# Patient Record
Sex: MALE | Race: OTHER | HISPANIC OR LATINO
[De-identification: names, ages, dates, MRNs, and addresses within clinical notes are randomized per-mention and may not be internally consistent; named-entity substitution may affect disease eponyms.]

---

## 2021-08-26 ENCOUNTER — RESULT REVIEW (OUTPATIENT)
Age: 81
End: 2021-08-26

## 2021-08-26 ENCOUNTER — APPOINTMENT (OUTPATIENT)
Dept: SURGERY | Facility: CLINIC | Age: 81
End: 2021-08-26
Payer: MEDICARE

## 2021-08-26 VITALS
OXYGEN SATURATION: 95 % | HEART RATE: 79 BPM | BODY MASS INDEX: 25.16 KG/M2 | HEIGHT: 63 IN | DIASTOLIC BLOOD PRESSURE: 80 MMHG | WEIGHT: 142 LBS | SYSTOLIC BLOOD PRESSURE: 110 MMHG | TEMPERATURE: 97.2 F

## 2021-08-26 DIAGNOSIS — R91.1 SOLITARY PULMONARY NODULE: ICD-10-CM

## 2021-08-26 DIAGNOSIS — K21.9 GASTRO-ESOPHAGEAL REFLUX DISEASE W/OUT ESOPHAGITIS: ICD-10-CM

## 2021-08-26 DIAGNOSIS — E11.9 TYPE 2 DIABETES MELLITUS W/OUT COMPLICATIONS: ICD-10-CM

## 2021-08-26 DIAGNOSIS — Z87.19 PERSONAL HISTORY OF OTHER DISEASES OF THE DIGESTIVE SYSTEM: ICD-10-CM

## 2021-08-26 DIAGNOSIS — E78.5 HYPERLIPIDEMIA, UNSPECIFIED: ICD-10-CM

## 2021-08-26 DIAGNOSIS — Z78.9 OTHER SPECIFIED HEALTH STATUS: ICD-10-CM

## 2021-08-26 DIAGNOSIS — R19.4 CHANGE IN BOWEL HABIT: ICD-10-CM

## 2021-08-26 DIAGNOSIS — R63.0 ANOREXIA: ICD-10-CM

## 2021-08-26 DIAGNOSIS — K57.30 DIVERTICULOSIS OF LARGE INTESTINE W/OUT PERFORATION OR ABSCESS W/OUT BLEEDING: ICD-10-CM

## 2021-08-26 DIAGNOSIS — Z87.09 PERSONAL HISTORY OF OTHER DISEASES OF THE RESPIRATORY SYSTEM: ICD-10-CM

## 2021-08-26 DIAGNOSIS — M51.36 OTHER INTERVERTEBRAL DISC DEGENERATION, LUMBAR REGION: ICD-10-CM

## 2021-08-26 PROCEDURE — 99205 OFFICE O/P NEW HI 60 MIN: CPT

## 2021-08-26 RX ORDER — AMLODIPINE BESYLATE 5 MG/1
TABLET ORAL
Refills: 0 | Status: ACTIVE | COMMUNITY

## 2021-08-26 RX ORDER — GLIPIZIDE 2.5 MG/1
TABLET ORAL
Refills: 0 | Status: ACTIVE | COMMUNITY

## 2021-08-26 RX ORDER — TICAGRELOR 60 MG/1
TABLET ORAL
Refills: 0 | Status: ACTIVE | COMMUNITY

## 2021-08-26 RX ORDER — FAMOTIDINE 20 MG/1
20 TABLET, FILM COATED ORAL
Refills: 0 | Status: ACTIVE | COMMUNITY

## 2021-08-26 RX ORDER — FLUTICASONE PROPIONATE 50 MCG
SPRAY, SUSPENSION NASAL
Refills: 0 | Status: ACTIVE | COMMUNITY

## 2021-08-31 ENCOUNTER — OUTPATIENT (OUTPATIENT)
Dept: OUTPATIENT SERVICES | Facility: HOSPITAL | Age: 81
LOS: 1 days | End: 2021-08-31
Payer: MEDICARE

## 2021-08-31 ENCOUNTER — APPOINTMENT (OUTPATIENT)
Dept: CT IMAGING | Facility: CLINIC | Age: 81
End: 2021-08-31
Payer: MEDICARE

## 2021-08-31 ENCOUNTER — TRANSCRIPTION ENCOUNTER (OUTPATIENT)
Age: 81
End: 2021-08-31

## 2021-08-31 DIAGNOSIS — R10.2 PELVIC AND PERINEAL PAIN: ICD-10-CM

## 2021-08-31 PROCEDURE — 74176 CT ABD & PELVIS W/O CONTRAST: CPT | Mod: 26,MH

## 2021-08-31 PROCEDURE — 74176 CT ABD & PELVIS W/O CONTRAST: CPT

## 2021-09-03 PROBLEM — R19.4 FREQUENT BOWEL MOVEMENTS: Status: ACTIVE | Noted: 2021-08-26

## 2021-09-03 PROBLEM — K57.30 DIVERTICULOSIS OF COLON: Status: ACTIVE | Noted: 2021-08-26

## 2021-09-03 NOTE — REASON FOR VISIT
[Initial Evaluation] : an initial evaluation [FreeTextEntry1] : regarding "lower abdominal pain... my hernia?"

## 2021-09-03 NOTE — PLAN
[FreeTextEntry1] : Symptomatic left inguinal hernia.  \par \par Given the associated discomfort, any GI complaint, persistence of symptoms as well as the patient's understandable anxiety, at least consideration of operative intervention does seem indicated and appropriate.\par \par The risks, benefits and nature of the operative intervention have been reviewed at length with Mr. GARRISON , including but not limited to the approximate size and location of the estimated or typical operative incision, the planned utilization of a permanent mesh, the possibility of urinary retention during the perioperative period, the possibility of infection, the possibility of chronic groin pain or perineural scarring or paresthesias or loss of sensation, the possibility of future recurrence and the likelihood of a visually noticeable “scar”/ incision with residual abdominal wall asymmetry.  The use of the On-Q Pain Pump has been discussed.\par \par Mr. GARRISON  understands that a period of recovery and cessation of vigorous activity will be required.  Mr. GARRISON demonstrates overall good insight and remains eager to proceed.  While there are no further specific concerns presently, I have welcomed follow-up with me and my office at any time in the interim to discuss any future questions.  Operative intervention can follow any standard pre-surgical testing or medical clearance.\par \par The hernia is reducible today, but with effort.  Given the intimacy of the colon, I do think that he is at risk for a more complex course should there be an emergency.  The right groin is clinically intact on my dedicated exam.  Given the time lapse since the initial study and the concern for the contralateral side (he has also had a colonoscopy following the CT by his own history today), I do think that interval imaging would help further delineate the anatomy and assist in operative planning.  He is pleased and agrees. \par \par In the interim, I remain available.\par \par Please note that more than 50% of face to face time was spent in counseling and coordination of care.

## 2021-09-03 NOTE — DATA REVIEWED
[FreeTextEntry1] : Outside CT abdomen and pelvis\par \par 6/23/2021\par \par "large non obstructing fat containing left inguinal hernia contains a portion of the distal descending/ proximal sigmoid colon.  Wall thickening versus under-distension of the colon at the site of the hernia..."\par \par "fat-containing right groin hernia... likely a femoral hernia."

## 2021-09-03 NOTE — REVIEW OF SYSTEMS
[Feeling Poorly] : feeling poorly [Feeling Tired] : feeling tired [Recent Weight Loss (___ Lbs)] : recent [unfilled] ~Ulb weight loss [As Noted in HPI] : as noted in HPI [Abdominal Pain] : abdominal pain [Joint Swelling] : joint swelling [Joint Stiffness] : joint stiffness [Anxiety] : anxiety [Depression] : no depression [Negative] : Heme/Lymph [FreeTextEntry7] : Lower abdominal "pressure... pain... bloating..." [FreeTextEntry9] : "arthritis..." [de-identified] : regarding this issue and visit...

## 2021-09-03 NOTE — HISTORY OF PRESENT ILLNESS
[de-identified] : Very pleasant though admittedly anxious patient presenting with complaint of lower abdominal discomfort for "some time."\par He also complains of general fatigue, malaise, loss of appetite with weight loss and yet 3 to 4 BMs daily/\par He has had an extensive work up to date in regards to these complaints.\par He was urgently directed to office today due to recent CT scan concerning of incarceration of left inguinal hernia.\par Fortunately, he denies pain at present...

## 2021-09-03 NOTE — PHYSICAL EXAM
[Respiratory Effort] : normal respiratory effort [Normal Rate and Rhythm] : normal rate and rhythm [No HSM] : no hepatosplenomegaly [Tender] : was nontender [Enlarged] : not enlarged [No Rash or Lesion] : No rash or lesion [Alert] : alert [Oriented to Person] : oriented to person [Oriented to Place] : oriented to place [Oriented to Time] : oriented to time [Anxious] : anxious [de-identified] : Appears mildly fatigued/ anxious, but no acute distress, ambulates easily into office and assumes examination table without need of assistance.  [de-identified] : Normocephalic and atraumatic.  [de-identified] : Supple with full range of motion.  [FreeTextEntry1] : No cervical, supraclavicular, axillary or inguinal adenopathy.  [de-identified] : No visible lesions or palpable masses [de-identified] : Soft non tense and non tender.\par Epigastrium WNL.\par Periumbilical fascia intact.\par Right groin with faint/ well healed inguinal hernia.\par No visible/ palpable inguinal, femoral or Spigelian hernia at this time.\par Left groin with moderately sized and soft inguinal hernia.\par Fully reducible, though more extended maneuvers required.\par Adjacent femoral and Spigelian spaces intact. [de-identified] : Penis free of lesions.  Cords free of cysts.  Left testicle slightly smaller and more high riding than right- but both free of mass. [de-identified] : Deferred.  [de-identified] : Grossly symmetric and within normal limits without any obvious motor or sensory deficits.  [de-identified] : though appropriately so...

## 2021-09-22 ENCOUNTER — APPOINTMENT (OUTPATIENT)
Dept: SURGERY | Facility: CLINIC | Age: 81
End: 2021-09-22
Payer: MEDICARE

## 2021-09-22 VITALS
HEIGHT: 63 IN | HEART RATE: 55 BPM | TEMPERATURE: 97.9 F | DIASTOLIC BLOOD PRESSURE: 68 MMHG | WEIGHT: 142 LBS | SYSTOLIC BLOOD PRESSURE: 100 MMHG | BODY MASS INDEX: 25.16 KG/M2 | OXYGEN SATURATION: 98 %

## 2021-09-22 PROCEDURE — 99215 OFFICE O/P EST HI 40 MIN: CPT

## 2021-09-22 RX ORDER — CETIRIZINE HYDROCHLORIDE 10 MG/1
10 TABLET, COATED ORAL
Refills: 0 | Status: ACTIVE | COMMUNITY

## 2021-09-22 RX ORDER — ESOMEPRAZOLE MAGNESIUM 5 MG/1
GRANULE, DELAYED RELEASE ORAL
Refills: 0 | Status: COMPLETED | COMMUNITY
End: 2021-09-22

## 2021-09-22 RX ORDER — IPRATROPIUM BROMIDE 21 UG/1
0.03 SPRAY NASAL
Refills: 0 | Status: ACTIVE | COMMUNITY

## 2021-09-22 RX ORDER — PANTOPRAZOLE SODIUM 40 MG/1
GRANULE, DELAYED RELEASE ORAL
Refills: 0 | Status: COMPLETED | COMMUNITY
End: 2021-09-22

## 2021-09-26 NOTE — HISTORY OF PRESENT ILLNESS
[de-identified] : Very pleasant though admittedly anxious patient presenting with complaint of lower abdominal discomfort for "some time."\par He also complains of general fatigue, malaise, loss of appetite with weight loss and yet 3 to 4 BMs daily/\par He has had an extensive work up to date in regards to these complaints.\par He was urgently directed to office today due to recent CT scan concerning of incarceration of left inguinal hernia.\par Fortunately, he denies pain at present... [de-identified] : Very pleasant though admittedly anxious gentleman returning for ongoing General Surgery follow-up.\par He reports that his lower abdominal discomfort has improved, but not resolved.\par He continues to complain of mild general fatigue +/-  malaise, some loss of appetite and 3 to 4 BMs small BM's daily.\par He has had an extensive work up to date in regards to these complaints including a follow-up CT under my direction..\par He is aware of a visible and palpable fullness in the left groin, but denies any appreciable change in the right groin. \par Fortunately, he denies acute pain once again today...

## 2021-09-26 NOTE — PHYSICAL EXAM
[Respiratory Effort] : normal respiratory effort [Normal Rate and Rhythm] : normal rate and rhythm [No HSM] : no hepatosplenomegaly [Tender] : was nontender [Enlarged] : not enlarged [No Rash or Lesion] : No rash or lesion [Alert] : alert [Oriented to Person] : oriented to person [Oriented to Place] : oriented to place [Oriented to Time] : oriented to time [Anxious] : anxious [de-identified] : Appears minimally fatigued/ anxious, but no acute distress. [de-identified] : Remains normocephalic and atraumatic.  [de-identified] : Still supple with full range of motion.  [FreeTextEntry1] : No cervical, supraclavicular, axillary or inguinal adenopathy on my exam.  [de-identified] : No visible lesion or palpable mass. [de-identified] : Soft, non tense and non tender- all stable.\par Epigastrium WNL- all stable.\par Periumbilical fascia intact- all stable.\par Right groin with faint/ well healed inguinal hernia repair incision- all stable.\par No visible/ palpable recurrent right inguinal, femoral or Spigelian hernia.\par Left groin with moderately sized and soft inguinal hernia.\par Fully reducible, though more extended maneuvers once again required.\par Adjacent femoral and Spigelian spaces intact- all stable. [de-identified] : Penis free of lesion.  Cords free of cyst.  Left testicle slightly smaller and more high riding than right- but both free of masses. [de-identified] : Deferred.  [de-identified] : Grossly symmetric and within normal limits without motor or sensory deficit.  [de-identified] : albeit less so...

## 2021-09-26 NOTE — REVIEW OF SYSTEMS
[Feeling Poorly] : not feeling poorly [Feeling Tired] : feeling tired [Recent Weight Loss (___ Lbs)] : recent [unfilled] ~Ulb weight loss [As Noted in HPI] : as noted in HPI [Abdominal Pain] : abdominal pain [Joint Swelling] : joint swelling [Joint Stiffness] : joint stiffness [Anxiety] : anxiety [Depression] : no depression [Negative] : Heme/Lymph [FreeTextEntry7] : Lower abdominal "pressure... pain... bloating..." [FreeTextEntry9] : "arthritis..." [de-identified] : regarding this issue and visit, though less so...

## 2021-09-26 NOTE — DATA REVIEWED
[FreeTextEntry1] : EXAM:  CT ABDOMEN AND PELVIS OC  \par PROCEDURE DATE:  08/31/2021  \par INTERPRETATION:  CLINICAL INFORMATION: Inguinal hernia, abdominal pain, history of blue spell limits.\par COMPARISON: CT 6/27/2013\par CONTRAST/COMPLICATIONS:\par IV Contrast: NONE\par Oral Contrast: Smoothie Readi-Cat 2\par Complications: None reported at time of study completion\par PROCEDURE:\par CT of the Abdomen and Pelvis was performed.\par Sagittal and coronal reformats were performed.\par FINDINGS: Evaluation of the solid organs and vasculature is limited in the absence of intravenous contrast.\par LOWER CHEST: Aortic and coronary artery calcifications.\par LIVER: Within normal limits.\par BILE DUCTS: Normal caliber.\par GALLBLADDER: Within normal limits.\par SPLEEN: Within normal limits.\par PANCREAS: Within normal limits.\par ADRENALS: Within normal limits.\par KIDNEYS/URETERS: Small left renal cyst. No renal calculi or hydronephrosis.\par BLADDER: Underdistended.\par REPRODUCTIVE ORGANS: Enlarged prostate.\par BOWEL: Diffuse colonic diverticulosis without evidence of diverticulitis. No bowel obstruction. Appendix is normal.\par PERITONEUM: No ascites.\par VESSELS: Atherosclerotic changes.\par RETROPERITONEUM/LYMPH NODES: No lymphadenopathy.\par ABDOMINAL WALL: Left inguinal hernia contains non obstructed sigmoid colon.\par BONES: Degenerative changes.\par IMPRESSION:\par Left inguinal hernia containing non obstructed sigmoid colon.\par --- End of Report ---\par KIAN BUTLER MD; Attending Radiologist \par This document has been electronically signed. Sep  5 2021  8:04AM

## 2021-09-26 NOTE — REASON FOR VISIT
[Follow-Up: _____] : a [unfilled] follow-up visit [FreeTextEntry1] : regarding "lower abdominal pain... my hernia?"

## 2021-09-26 NOTE — PLAN
[FreeTextEntry1] : Symptomatic left inguinal hernia containing non obstructing colon.  \par \par Given the seemingly associated discomfort, ongoing GI complaints, persistence of symptoms over time as well as the patient's understandable anxiety, operative intervention indicated and appropriate.\par \par The risks, benefits and nature of the operative intervention have been reviewed again at length with Mr. AGRRISON , including but not limited to the approximate size and location of the estimated or typical operative incision, the planned utilization of a permanent mesh, the possibility of urinary retention during the perioperative period, the possibility of infection, the possibility of chronic groin pain or perineural scarring or paresthesias or loss of sensation, the possibility of future recurrence and the likelihood of a visually noticeable “scar”/ incision with residual abdominal wall asymmetry.  The use of the On-Q Pain Pump has been discussed.\par \par Mr. GARRISON  understands that a period of recovery and cessation of vigorous activity will be required.  Mr. GARRISON demonstrates overall good insight and remains eager to proceed.  While there are no further specific concerns presently, I have welcomed follow-up with me and my office at any time in the interim to discuss any future questions.  Operative intervention can follow any standard pre-surgical testing or medical clearance.\par \par The right groin is free of complaint per patient, once again clinically intact on my repeat exam and now non-suggestive on interval CT exam.\par \par Given the involvement of the colon within the hernia, a modified bowel preparation will be completed prior to the OR.  He has been given those instructions today.\par \par Please note that more than 50% of face to face time was spent in counseling and coordination of care.

## 2021-11-08 ENCOUNTER — OUTPATIENT (OUTPATIENT)
Dept: OUTPATIENT SERVICES | Facility: HOSPITAL | Age: 81
LOS: 1 days | End: 2021-11-08
Payer: MEDICARE

## 2021-11-08 VITALS
DIASTOLIC BLOOD PRESSURE: 70 MMHG | HEIGHT: 61 IN | OXYGEN SATURATION: 96 % | HEART RATE: 71 BPM | WEIGHT: 134.04 LBS | SYSTOLIC BLOOD PRESSURE: 180 MMHG | RESPIRATION RATE: 16 BRPM | TEMPERATURE: 98 F

## 2021-11-08 DIAGNOSIS — K40.90 UNILATERAL INGUINAL HERNIA, WITHOUT OBSTRUCTION OR GANGRENE, NOT SPECIFIED AS RECURRENT: ICD-10-CM

## 2021-11-08 DIAGNOSIS — Z98.49 CATARACT EXTRACTION STATUS, UNSPECIFIED EYE: Chronic | ICD-10-CM

## 2021-11-08 DIAGNOSIS — Z01.818 ENCOUNTER FOR OTHER PREPROCEDURAL EXAMINATION: ICD-10-CM

## 2021-11-08 LAB
ALBUMIN SERPL ELPH-MCNC: 3.9 G/DL — SIGNIFICANT CHANGE UP (ref 3.3–5)
ALP SERPL-CCNC: 62 U/L — SIGNIFICANT CHANGE UP (ref 30–120)
ALT FLD-CCNC: 19 U/L DA — SIGNIFICANT CHANGE UP (ref 10–60)
ANION GAP SERPL CALC-SCNC: 7 MMOL/L — SIGNIFICANT CHANGE UP (ref 5–17)
AST SERPL-CCNC: 23 U/L — SIGNIFICANT CHANGE UP (ref 10–40)
BILIRUB SERPL-MCNC: 0.4 MG/DL — SIGNIFICANT CHANGE UP (ref 0.2–1.2)
BUN SERPL-MCNC: 19 MG/DL — SIGNIFICANT CHANGE UP (ref 7–23)
CALCIUM SERPL-MCNC: 9.2 MG/DL — SIGNIFICANT CHANGE UP (ref 8.4–10.5)
CHLORIDE SERPL-SCNC: 102 MMOL/L — SIGNIFICANT CHANGE UP (ref 96–108)
CO2 SERPL-SCNC: 28 MMOL/L — SIGNIFICANT CHANGE UP (ref 22–31)
CREAT SERPL-MCNC: 1.47 MG/DL — HIGH (ref 0.5–1.3)
GLUCOSE SERPL-MCNC: 85 MG/DL — SIGNIFICANT CHANGE UP (ref 70–99)
HCT VFR BLD CALC: 40.4 % — SIGNIFICANT CHANGE UP (ref 39–50)
HGB BLD-MCNC: 13 G/DL — SIGNIFICANT CHANGE UP (ref 13–17)
MCHC RBC-ENTMCNC: 28 PG — SIGNIFICANT CHANGE UP (ref 27–34)
MCHC RBC-ENTMCNC: 32.2 GM/DL — SIGNIFICANT CHANGE UP (ref 32–36)
MCV RBC AUTO: 87.1 FL — SIGNIFICANT CHANGE UP (ref 80–100)
NRBC # BLD: 0 /100 WBCS — SIGNIFICANT CHANGE UP (ref 0–0)
PLATELET # BLD AUTO: 271 K/UL — SIGNIFICANT CHANGE UP (ref 150–400)
POTASSIUM SERPL-MCNC: 4.3 MMOL/L — SIGNIFICANT CHANGE UP (ref 3.5–5.3)
POTASSIUM SERPL-SCNC: 4.3 MMOL/L — SIGNIFICANT CHANGE UP (ref 3.5–5.3)
PROT SERPL-MCNC: 7.6 G/DL — SIGNIFICANT CHANGE UP (ref 6–8.3)
RBC # BLD: 4.64 M/UL — SIGNIFICANT CHANGE UP (ref 4.2–5.8)
RBC # FLD: 14.3 % — SIGNIFICANT CHANGE UP (ref 10.3–14.5)
SODIUM SERPL-SCNC: 137 MMOL/L — SIGNIFICANT CHANGE UP (ref 135–145)
WBC # BLD: 8.36 K/UL — SIGNIFICANT CHANGE UP (ref 3.8–10.5)
WBC # FLD AUTO: 8.36 K/UL — SIGNIFICANT CHANGE UP (ref 3.8–10.5)

## 2021-11-08 PROCEDURE — G0463: CPT

## 2021-11-08 PROCEDURE — 36415 COLL VENOUS BLD VENIPUNCTURE: CPT

## 2021-11-08 PROCEDURE — 93010 ELECTROCARDIOGRAM REPORT: CPT

## 2021-11-08 PROCEDURE — 83036 HEMOGLOBIN GLYCOSYLATED A1C: CPT

## 2021-11-08 PROCEDURE — 93005 ELECTROCARDIOGRAM TRACING: CPT

## 2021-11-08 PROCEDURE — 85027 COMPLETE CBC AUTOMATED: CPT

## 2021-11-08 PROCEDURE — 80053 COMPREHEN METABOLIC PANEL: CPT

## 2021-11-08 NOTE — H&P PST ADULT - HISTORY OF PRESENT ILLNESS
82 yo male is scheduled for open repair of left inguinal hernia on 11/16/2021 at [facility].  He reports left groin pressure and increased bowel movements for last 3 months.  he was diagnosed with left inguinal hernia by MRI and sonogram.

## 2021-11-08 NOTE — ASU DISCHARGE PLAN (ADULT/PEDIATRIC) - BATHING
May shower after 48 hours/Shower only/Do not submerge in water after 48 hours/Shower only/Do not submerge in water

## 2021-11-08 NOTE — H&P PST ADULT - NSICDXPASTMEDICALHX_GEN_ALL_CORE_FT
PAST MEDICAL HISTORY:  Autonomic peripheral neuropathy     CAD (coronary artery disease)     COVID-19 vaccine series completed     Diverticulitis     Dyslipidemia     Gastritis, erosive     Hearing loss     Hepatitis B, chronic     Hernia, inguinal, left     Hypertension     MI (myocardial infarction) 1989    Post traumatic stress disorder (PTSD)     Postnasal drip     Renal cyst     Retroperitoneal hernia     S/P primary angioplasty with coronary stent     Sleep apnea     Stented coronary artery X9    Type 2 diabetes mellitus

## 2021-11-08 NOTE — H&P PST ADULT - NSICDXPASTSURGICALHX_GEN_ALL_CORE_FT
PAST SURGICAL HISTORY:  Dental disorder     H/O right inguinal hernia repair age 9    H/O vasectomy     H/O ventral hernia repair with hiatal hernia repair, 2011    History of cataract surgery bilateral, 2016    S/P CABG x 3 2000

## 2021-11-08 NOTE — ASU DISCHARGE PLAN (ADULT/PEDIATRIC) - CARE PROVIDER_API CALL
Kevan Bang)  Surgery  [address], [place] [id number]  Phone: [phone]  Fax: [phone]  Follow Up Time:    Kevan Bang)  Surgery  [address], [place] [id number]  Phone: [phone]  Fax: [phone]  Follow Up Time: 2 weeks

## 2021-11-08 NOTE — H&P PST ADULT - NSICDXFAMILYHX_GEN_ALL_CORE_FT
FAMILY HISTORY:  Father  Still living? No  Family history of heart disease, Age at diagnosis: Age Unknown    Sibling  Still living? No  Family history of heart attack, Age at diagnosis: Age Unknown

## 2021-11-08 NOTE — H&P PST ADULT - ASSESSMENT
80 yo male is scheduled for open repair of left inguinal hernia on 11/16/2021 @ [facility]

## 2021-11-08 NOTE — H&P PST ADULT - PROBLEM SELECTOR PLAN 1
Open repair of left inguinal hernia is planned for 11/16/2021   Covid PCR testing is scheduled for 11/12/2021 @ [facility]  Patient stopped taking Billinta as per Dr Bang instructions on 11/6/2021; I called Dr Bang office to verify.  I called Dr Cisneros to inform him patient stopped Brillinta and aspirin; left a message with nurse Orantes.    patient instructed to follow up with Dr cisneros  Cardiac and medical clearance appointment scheduled for 11/11/21  Pre op instructions reviewed; contact info given; best wishes offered.

## 2021-11-08 NOTE — ASU DISCHARGE PLAN (ADULT/PEDIATRIC) - ASU DC SPECIAL INSTRUCTIONSFT
Follow all verbal and written instructions. Take medications as prescribed. DO NOT drive, operate machinery, and/or make important decisions while on prescription pain medication. DO NOT hesitate to call Doctor's office with questions or concerns. Certain prescription pain medication can cause constipation; take a stool softener such as Colace 100mg 3 x a day, to avoid the constipating effects of prescription pain medication.    Make sure white clip is NOT crimping the tubing of your On Q pump, so as to allow flow of medication. After 48 hours - remove dressing, pull out tubing, and put bandaids over the site.    For the next 24-48 hours while awake, alternate ice pack 15 minutes on & 15 minutes off    Call Dr. Smith's office at [phone] to make an appointment to be seen within 7- 10 days REST and relax!    Apply ice packs for 30 minutes on/ 30 minutes off every hour while awake for next 48 hours.  May take Tylenol for minor pain.  Please minimize any Aspirin, Advil or Motrin for the next 48 hours.  A script for pain medication has been forwarded to your pharmacy.  Take an over the counter stool softener like COLACE twice daily to prevent constipation.  You may remove outer dressings with On-Q Pain Pump after 48 hours.   Leave Steri Strips in place.  Once the Pump is removed, you may then restart your Brilinta.  Call for any questions or concerns!

## 2021-11-09 LAB
A1C WITH ESTIMATED AVERAGE GLUCOSE RESULT: 5.8 % — HIGH (ref 4–5.6)
ESTIMATED AVERAGE GLUCOSE: 120 MG/DL — HIGH (ref 68–114)

## 2021-11-12 ENCOUNTER — OUTPATIENT (OUTPATIENT)
Dept: OUTPATIENT SERVICES | Facility: HOSPITAL | Age: 81
LOS: 1 days | End: 2021-11-12
Payer: MEDICARE

## 2021-11-12 DIAGNOSIS — K40.90 UNILATERAL INGUINAL HERNIA, WITHOUT OBSTRUCTION OR GANGRENE, NOT SPECIFIED AS RECURRENT: ICD-10-CM

## 2021-11-12 DIAGNOSIS — Z01.818 ENCOUNTER FOR OTHER PREPROCEDURAL EXAMINATION: ICD-10-CM

## 2021-11-12 DIAGNOSIS — Z20.828 CONTACT WITH AND (SUSPECTED) EXPOSURE TO OTHER VIRAL COMMUNICABLE DISEASES: ICD-10-CM

## 2021-11-12 DIAGNOSIS — Z98.49 CATARACT EXTRACTION STATUS, UNSPECIFIED EYE: Chronic | ICD-10-CM

## 2021-11-12 LAB — SARS-COV-2 RNA SPEC QL NAA+PROBE: SIGNIFICANT CHANGE UP

## 2021-11-12 PROCEDURE — U0005: CPT

## 2021-11-12 PROCEDURE — U0003: CPT

## 2021-11-15 ENCOUNTER — TRANSCRIPTION ENCOUNTER (OUTPATIENT)
Age: 81
End: 2021-11-15

## 2021-11-15 VITALS
RESPIRATION RATE: 19 BRPM | DIASTOLIC BLOOD PRESSURE: 60 MMHG | SYSTOLIC BLOOD PRESSURE: 152 MMHG | HEIGHT: 63 IN | OXYGEN SATURATION: 100 % | HEART RATE: 53 BPM | TEMPERATURE: 98 F | WEIGHT: 134.92 LBS

## 2021-11-15 NOTE — ASU PATIENT PROFILE, ADULT - TOBACCO USE
Never smoker
Laceration    A laceration is a cut that goes through all of the layers of the skin and into the tissue that is right under the skin. Some lacerations heal on their own. Others need to be closed with skin adhesive strips, skin glue, stitches (sutures), or staples. Proper laceration care minimizes the risk of infection and helps the laceration to heal better.  If non-absorbable stitches or staples have been placed, they must be taken out within the time frame instructed by your healthcare provider.    SEEK IMMEDIATE MEDICAL CARE IF YOU HAVE ANY OF THE FOLLOWING SYMPTOMS: swelling around the wound, worsening pain, drainage from the wound, red streaking going away from your wound, inability to move finger or toe near the laceration, or discoloration of skin near the laceration.    -Please follow up with your Primary Care Doctor within 24-48 hours and bring your paperwork     -Please return to ED in 10-14 days for suture removal and wound check    ??  ????????????????????????? ??????????? ???????????,????,??(??)???????? ?????????????????,???????????? ???????????????,??????????????????????????  ??????????,?????????:??????,????,????,?????????,???????????????,????????? ???  -??24-48?????????????,?????????  -??10?14?????ED?????????  Angela kelly. Geno tierneyg keyi zìxíng yùhé. Qíta de zé xuyào yòng pífu benjamín tiáo, pífu frank, avinash edward (avinash edward) huò dìng shelia ding lái fengdunia. Shìdàng de liè falguni balderas fengjermaine blakely dào zuìdi, bìng you zhù yú liè falguni gèndio joann de yùhé. Rúguo fàng zhì henry bùke xishou de ravindrakarol waite, zé bìxu zài nín de yiliáo fúwù wendyong avila zhishì de shíjinuha pabloi courtney buchanan quchu.  Rúguo nín you yixià rènhé saúlradha, dionisio lì xúnqiú yiliáo hùli: Shangkou maggy howell, carlene martinez, shangkou yinliú, shangkou shàng chuxiàn hóngsè tiáowén, shangkou si liè fùjìn wújavier yídòng shouzhi huò jiaozhi, huò shangkou fùjìn pífu biànsè liè falguni.  -Dionisio zài 24-48 xiaoshí nèi gen jìn nín de chují bakiran berg, telma gtz shàng nín de [facility]  -dionisio zài 10 dào 14 cristobal rowan nèi fanhuí ED jìnxíng fénghé hé niallgkou jiaherlinda

## 2021-11-16 ENCOUNTER — OUTPATIENT (OUTPATIENT)
Dept: OUTPATIENT SERVICES | Facility: HOSPITAL | Age: 81
LOS: 1 days | End: 2021-11-16
Payer: MEDICARE

## 2021-11-16 ENCOUNTER — APPOINTMENT (OUTPATIENT)
Dept: SURGERY | Facility: HOSPITAL | Age: 81
End: 2021-11-16

## 2021-11-16 ENCOUNTER — RESULT REVIEW (OUTPATIENT)
Age: 81
End: 2021-11-16

## 2021-11-16 VITALS
HEART RATE: 76 BPM | RESPIRATION RATE: 18 BRPM | SYSTOLIC BLOOD PRESSURE: 123 MMHG | OXYGEN SATURATION: 95 % | DIASTOLIC BLOOD PRESSURE: 56 MMHG

## 2021-11-16 DIAGNOSIS — Z98.49 CATARACT EXTRACTION STATUS, UNSPECIFIED EYE: Chronic | ICD-10-CM

## 2021-11-16 DIAGNOSIS — Z01.818 ENCOUNTER FOR OTHER PREPROCEDURAL EXAMINATION: ICD-10-CM

## 2021-11-16 DIAGNOSIS — K40.90 UNILATERAL INGUINAL HERNIA, WITHOUT OBSTRUCTION OR GANGRENE, NOT SPECIFIED AS RECURRENT: ICD-10-CM

## 2021-11-16 PROCEDURE — 49507 PRP I/HERN INIT BLOCK >5 YR: CPT | Mod: AS

## 2021-11-16 PROCEDURE — 49507 PRP I/HERN INIT BLOCK >5 YR: CPT | Mod: LT

## 2021-11-16 PROCEDURE — 11981 INSERTION DRUG DLVR IMPLANT: CPT

## 2021-11-16 PROCEDURE — 82962 GLUCOSE BLOOD TEST: CPT

## 2021-11-16 PROCEDURE — 88302 TISSUE EXAM BY PATHOLOGIST: CPT | Mod: 26

## 2021-11-16 PROCEDURE — 49507 PRP I/HERN INIT BLOCK >5 YR: CPT

## 2021-11-16 PROCEDURE — 88302 TISSUE EXAM BY PATHOLOGIST: CPT

## 2021-11-16 PROCEDURE — C1781: CPT

## 2021-11-16 RX ORDER — ATENOLOL 25 MG/1
1 TABLET ORAL
Qty: 0 | Refills: 0 | DISCHARGE

## 2021-11-16 RX ORDER — TICAGRELOR 90 MG/1
0 TABLET ORAL
Qty: 0 | Refills: 0 | DISCHARGE

## 2021-11-16 RX ORDER — ONDANSETRON 8 MG/1
4 TABLET, FILM COATED ORAL ONCE
Refills: 0 | Status: DISCONTINUED | OUTPATIENT
Start: 2021-11-16 | End: 2021-11-17

## 2021-11-16 RX ORDER — CEFAZOLIN SODIUM 1 G
2000 VIAL (EA) INJECTION ONCE
Refills: 0 | Status: COMPLETED | OUTPATIENT
Start: 2021-11-16 | End: 2021-11-16

## 2021-11-16 RX ORDER — SODIUM CHLORIDE 9 MG/ML
1000 INJECTION, SOLUTION INTRAVENOUS
Refills: 0 | Status: DISCONTINUED | OUTPATIENT
Start: 2021-11-16 | End: 2021-11-17

## 2021-11-16 RX ORDER — METFORMIN HYDROCHLORIDE 850 MG/1
1 TABLET ORAL
Qty: 0 | Refills: 0 | DISCHARGE

## 2021-11-16 RX ORDER — SODIUM CHLORIDE 9 MG/ML
1000 INJECTION, SOLUTION INTRAVENOUS
Refills: 0 | Status: DISCONTINUED | OUTPATIENT
Start: 2021-11-16 | End: 2021-11-16

## 2021-11-16 RX ORDER — FAMOTIDINE 10 MG/ML
1 INJECTION INTRAVENOUS
Qty: 0 | Refills: 0 | DISCHARGE

## 2021-11-16 RX ORDER — CHLORHEXIDINE GLUCONATE 213 G/1000ML
1 SOLUTION TOPICAL ONCE
Refills: 0 | Status: COMPLETED | OUTPATIENT
Start: 2021-11-16 | End: 2021-11-16

## 2021-11-16 RX ORDER — BUPIVACAINE HCL/PF 7.5 MG/ML
100 VIAL (ML) INJECTION
Refills: 0 | Status: DISCONTINUED | OUTPATIENT
Start: 2021-11-16 | End: 2021-11-16

## 2021-11-16 RX ORDER — HYDROMORPHONE HYDROCHLORIDE 2 MG/ML
0.25 INJECTION INTRAMUSCULAR; INTRAVENOUS; SUBCUTANEOUS
Refills: 0 | Status: DISCONTINUED | OUTPATIENT
Start: 2021-11-16 | End: 2021-11-17

## 2021-11-16 RX ORDER — BENZOCAINE AND MENTHOL 5; 1 G/100ML; G/100ML
1 LIQUID ORAL ONCE
Refills: 0 | Status: COMPLETED | OUTPATIENT
Start: 2021-11-16 | End: 2021-11-16

## 2021-11-16 RX ORDER — EZETIMIBE AND SIMVASTATIN 10; 80 MG/1; MG/1
1 TABLET, FILM COATED ORAL
Qty: 0 | Refills: 0 | DISCHARGE

## 2021-11-16 RX ORDER — OXYCODONE HYDROCHLORIDE 5 MG/1
1 TABLET ORAL
Qty: 30 | Refills: 0
Start: 2021-11-16

## 2021-11-16 RX ORDER — AMLODIPINE BESYLATE 2.5 MG/1
1 TABLET ORAL
Qty: 0 | Refills: 0 | DISCHARGE

## 2021-11-16 RX ADMIN — BENZOCAINE AND MENTHOL 1 LOZENGE: 5; 1 LIQUID ORAL at 16:31

## 2021-11-16 RX ADMIN — SODIUM CHLORIDE 75 MILLILITER(S): 9 INJECTION, SOLUTION INTRAVENOUS at 09:32

## 2021-11-16 RX ADMIN — CHLORHEXIDINE GLUCONATE 1 APPLICATION(S): 213 SOLUTION TOPICAL at 09:31

## 2021-11-16 NOTE — BRIEF OPERATIVE NOTE - NSICDXBRIEFPOSTOP_GEN_ALL_CORE_FT
POST-OP DIAGNOSIS:  Unilateral inguinal hernia with obstruction and without gangrene 16-Nov-2021 15:10:45  Kevan Bang J

## 2021-11-16 NOTE — BRIEF OPERATIVE NOTE - NSICDXBRIEFPREOP_GEN_ALL_CORE_FT
PRE-OP DIAGNOSIS:  Unilateral inguinal hernia with obstruction and without gangrene 16-Nov-2021 15:10:35  Kevan Bang

## 2021-11-16 NOTE — BRIEF OPERATIVE NOTE - NSICDXBRIEFPROCEDURE_GEN_ALL_CORE_FT
PROCEDURES:  Open repair of incarcerated inguinal hernia using mesh in adult 16-Nov-2021 15:11:15 Open Repair of Incarcerated Left Inguinal Hernia with Mesh Kevan Bang  Insertion of drug delivery implant 16-Nov-2021 15:11:41  Kevan Bang

## 2021-12-01 ENCOUNTER — APPOINTMENT (OUTPATIENT)
Dept: SURGERY | Facility: CLINIC | Age: 81
End: 2021-12-01
Payer: MEDICARE

## 2021-12-01 VITALS
BODY MASS INDEX: 24.27 KG/M2 | TEMPERATURE: 97.7 F | WEIGHT: 137 LBS | OXYGEN SATURATION: 99 % | SYSTOLIC BLOOD PRESSURE: 102 MMHG | DIASTOLIC BLOOD PRESSURE: 70 MMHG | HEART RATE: 57 BPM | HEIGHT: 63 IN

## 2021-12-01 DIAGNOSIS — Z87.19 PERSONAL HISTORY OF OTHER DISEASES OF THE DIGESTIVE SYSTEM: ICD-10-CM

## 2021-12-01 PROBLEM — G47.30 SLEEP APNEA, UNSPECIFIED: Chronic | Status: ACTIVE | Noted: 2021-11-08

## 2021-12-01 PROBLEM — R09.82 POSTNASAL DRIP: Chronic | Status: ACTIVE | Noted: 2021-11-08

## 2021-12-01 PROBLEM — K40.90 UNILATERAL INGUINAL HERNIA, WITHOUT OBSTRUCTION OR GANGRENE, NOT SPECIFIED AS RECURRENT: Chronic | Status: ACTIVE | Noted: 2021-11-08

## 2021-12-01 PROBLEM — Z92.29 PERSONAL HISTORY OF OTHER DRUG THERAPY: Chronic | Status: ACTIVE | Noted: 2021-11-08

## 2021-12-01 PROBLEM — I10 ESSENTIAL (PRIMARY) HYPERTENSION: Chronic | Status: ACTIVE | Noted: 2021-11-08

## 2021-12-01 PROBLEM — E11.9 TYPE 2 DIABETES MELLITUS WITHOUT COMPLICATIONS: Chronic | Status: ACTIVE | Noted: 2021-11-08

## 2021-12-01 PROBLEM — H91.90 UNSPECIFIED HEARING LOSS, UNSPECIFIED EAR: Chronic | Status: ACTIVE | Noted: 2021-11-08

## 2021-12-01 PROCEDURE — 99024 POSTOP FOLLOW-UP VISIT: CPT

## 2021-12-01 NOTE — HISTORY OF PRESENT ILLNESS
[de-identified] : Very pleasant though admittedly anxious patient presenting with complaint of lower abdominal discomfort for "some time."\par He also complains of general fatigue, malaise, loss of appetite with weight loss and yet 3 to 4 BMs daily/\par He has had an extensive work up to date in regards to these complaints.\par He was urgently directed to office today due to recent CT scan concerning of incarceration of left inguinal hernia.\par Fortunately, he denies pain at present... [de-identified] : Very pleasant gentleman returning to the office in good spirits.\par He is now s/p his uncomplicated ambulatory repair of his left inguinal hernia with mesh.\par Mr. Pulliam denies any lingering discomfort or pain.\par He denies any ongoing GI or  complaints.\par He is pleased with the appearance of the incision, though he does note some residual soft tissue bruising...

## 2021-12-01 NOTE — REVIEW OF SYSTEMS
[Feeling Poorly] : not feeling poorly [Feeling Tired] : not feeling tired [Recent Weight Loss (___ Lbs)] : recent [unfilled] ~Ulb weight loss [Joint Swelling] : joint swelling [Joint Stiffness] : joint stiffness [As Noted in HPI] : as noted in HPI [Anxiety] : anxiety [Depression] : no depression [Negative] : Heme/Lymph [FreeTextEntry9] : "arthritis..." [de-identified] : though much less so...

## 2021-12-01 NOTE — REASON FOR VISIT
[Post Op: _________] : a [unfilled] post op visit [FreeTextEntry1] : regarding "lower abdominal pain... my hernia?"

## 2021-12-01 NOTE — PLAN
[FreeTextEntry1] : S/P uneventful general anesthesia with uncomplicated open repair of left inguinal hernia with mesh.\par Mr. GARRISON is clinically well by this history and surgically stable by this examination.\par There is no evidence by history or examination to suggest complication.\par Mr. GARRISON  is cleared to resume casual activities with own comfort as guide.\par To refrain from maximal exertions for another month.\par The timing and technique of scar massage reviewed in detail with Mr. GARRISON .\par The Pathology report and it's benign nature was reviewed in detail.\par Mr. GARRISON  has been encouraged to call with questions or concerns.  \par Otherwise, RTO in month to ensure full resolution of post-operative soft tissue changes.\par Mr. GARRISON is pleased and agrees, leaving in good spirits able to verbalize instructions as outlined above.

## 2021-12-01 NOTE — PHYSICAL EXAM
[Respiratory Effort] : normal respiratory effort [Normal Rate and Rhythm] : normal rate and rhythm [No HSM] : no hepatosplenomegaly [Tender] : was nontender [Enlarged] : not enlarged [No Rash or Lesion] : No rash or lesion [Alert] : alert [Oriented to Person] : oriented to person [Oriented to Place] : oriented to place [Oriented to Time] : oriented to time [Anxious] : anxious [de-identified] : Appears well, no acute distress, ambulates easily into office and assumes examination table without need of assistance.  [de-identified] : Remains normocephalic and atraumatic today.  [de-identified] : Still supple with full range of motion without changes.  [FreeTextEntry1] : No cervical, supraclavicular, axillary or inguinal adenopathy on this exam.  [de-identified] : No visible lesions or palpable masses. [de-identified] : Soft, non tense and non tender- all quite stable.\par Epigastrium WNL- all quite stable.\par Periumbilical fascia intact- all quite stable.\par Right groin with faint/ well healed inguinal hernia repair incision- all quite stable.\par No visible/ palpable recurrent right inguinal, femoral or Spigelian hernia.\par Left groin with well healing operative incision. \par Wound clean and dry and intact.\par There is no expressible drainage or appreciable odor.\par There are no SQ collections.\par Mild to moderate post-operative edema and ecchymoses, but certainly well within normal limits.\par No fascial laxity or defect throughout inguinal/ femoral or more lateral Spigelian hernia spaces. [de-identified] : Penis free of lesion.  Cords free of edema or hematoma.  Left testicle slightly smaller and more high riding than right- but all otherwise quite stable. [de-identified] : Deferred.  [de-identified] : Grossly symmetric and within normal limits without motor or sensory deficits.  [de-identified] : though minimally so concerning his overall progress and long term result...

## 2021-12-01 NOTE — DATA REVIEWED
[FreeTextEntry1] : Patient:   MYNOR GARRISON\par Accession:                             [id number]\par Collected Date/Time:                   11/16/2021 14:45 EST\par Received Date/Time:                    11/17/2021 09:17 EST\par Surgical Pathology Report - Auth (Verified)\par Specimen(s) Submitted\par Portion of cord lipoma left groin\par Final Diagnosis\par Mature vascularized adipose tissue, clinically lipoma of cord, left.\par Verified by: Bella Torrse MD  (Electronic Signature)\par Reported on: 11/18/21 15:26 EST, [address]\par Phone: [phone]   Fax: [phone]\par _________________________________________________________________

## 2022-01-05 ENCOUNTER — APPOINTMENT (OUTPATIENT)
Dept: SURGERY | Facility: CLINIC | Age: 82
End: 2022-01-05
Payer: MEDICARE

## 2022-01-05 VITALS
HEART RATE: 58 BPM | HEIGHT: 63 IN | BODY MASS INDEX: 24.53 KG/M2 | DIASTOLIC BLOOD PRESSURE: 74 MMHG | TEMPERATURE: 96.7 F | WEIGHT: 138.45 LBS | SYSTOLIC BLOOD PRESSURE: 100 MMHG | OXYGEN SATURATION: 97 %

## 2022-01-05 DIAGNOSIS — R10.2 PELVIC AND PERINEAL PAIN: ICD-10-CM

## 2022-01-05 DIAGNOSIS — Z87.19 OTHER SPECIFIED POSTPROCEDURAL STATES: ICD-10-CM

## 2022-01-05 DIAGNOSIS — Z98.890 OTHER SPECIFIED POSTPROCEDURAL STATES: ICD-10-CM

## 2022-01-05 PROCEDURE — 99024 POSTOP FOLLOW-UP VISIT: CPT

## 2022-01-05 RX ORDER — ISOSORBIDE DINITRATE 30 MG/1
30 TABLET ORAL
Refills: 0 | Status: DISCONTINUED | COMMUNITY
End: 2022-01-05

## 2022-01-05 NOTE — HISTORY OF PRESENT ILLNESS
[de-identified] : Very pleasant though admittedly anxious patient presenting with complaint of lower abdominal discomfort for "some time."\par He also complains of general fatigue, malaise, loss of appetite with weight loss and yet 3 to 4 BMs daily/\par He has had an extensive work up to date in regards to these complaints.\par He was urgently directed to office today due to recent CT scan concerning of incarceration of left inguinal hernia.\par Fortunately, he denies pain at present... [de-identified] : Very pleasant gentleman returns to the office today in good spirits.\par He is s/p an uncomplicated ambulatory repair of a left inguinal hernia with mesh.\par Mr. Pulliam denies any lingering discomfort or pain today.\par He denies any ongoing GI or  complaint.\par He is pleased with the appearance of the incision and the now full resolution of his residual soft tissue bruising...

## 2022-01-05 NOTE — HISTORY OF PRESENT ILLNESS
[de-identified] : Very pleasant though admittedly anxious patient presenting with complaint of lower abdominal discomfort for "some time."\par He also complains of general fatigue, malaise, loss of appetite with weight loss and yet 3 to 4 BMs daily/\par He has had an extensive work up to date in regards to these complaints.\par He was urgently directed to office today due to recent CT scan concerning of incarceration of left inguinal hernia.\par Fortunately, he denies pain at present... [de-identified] : Very pleasant gentleman returns to the office today in good spirits.\par He is s/p an uncomplicated ambulatory repair of a left inguinal hernia with mesh.\par Mr. Pulliam denies any lingering discomfort or pain today.\par He denies any ongoing GI or  complaint.\par He is pleased with the appearance of the incision and the now full resolution of his residual soft tissue bruising...

## 2022-01-05 NOTE — DATA REVIEWED
[FreeTextEntry1] : Patient:   MYNOR GARRISON\par Accession:                             [id number]\par Collected Date/Time:                   11/16/2021 14:45 EST\par Received Date/Time:                    11/17/2021 09:17 EST\par Surgical Pathology Report - Auth (Verified)\par Specimen(s) Submitted\par Portion of cord lipoma left groin\par Final Diagnosis\par Mature vascularized adipose tissue, clinically lipoma of cord, left.\par Verified by: Bella Torres MD  (Electronic Signature)\par Reported on: 11/18/21 15:26 EST, [address]\par Phone: [phone]   Fax: [phone]\par _________________________________________________________________

## 2022-01-05 NOTE — DATA REVIEWED
[FreeTextEntry1] : Patient:   MYNOR GARRISON\par Accession:                             [id number]\par Collected Date/Time:                   11/16/2021 14:45 EST\par Received Date/Time:                    11/17/2021 09:17 EST\par Surgical Pathology Report - Auth (Verified)\par Specimen(s) Submitted\par Portion of cord lipoma left groin\par Final Diagnosis\par Mature vascularized adipose tissue, clinically lipoma of cord, left.\par Verified by: Bella Torres MD  (Electronic Signature)\par Reported on: 11/18/21 15:26 EST, [address]\par Phone: [phone]   Fax: [phone]\par _________________________________________________________________

## 2022-01-05 NOTE — PLAN
[FreeTextEntry1] : S/P uneventful anesthesia with uncomplicated open repair of left inguinal hernia with mesh.\par Mr. GARRISON is clinically well by this history and surgically stable by this exam.\par There is no evidence by history or exam to suggest complication.\par Mr. GARRISON  is cleared to resume all activities with own comfort as guide.\par The timing and technique of scar massage reviewed again in detail with Mr. GARRISON .\par Mr. GARRISON  has been encouraged to call with questions or concerns at any time.\par I remain fully available to him.\par No present further issues to address.\par RTO is planned as PRN.\par Mr. GARRISON is pleased and agrees.\par He leaves in good spirits and is able to verbalize all instructions as outlined above.

## 2022-01-05 NOTE — PHYSICAL EXAM
[Respiratory Effort] : normal respiratory effort [Normal Rate and Rhythm] : normal rate and rhythm [No HSM] : no hepatosplenomegaly [Tender] : was nontender [Enlarged] : not enlarged [No Rash or Lesion] : No rash or lesion [Alert] : alert [Oriented to Person] : oriented to person [Oriented to Place] : oriented to place [Oriented to Time] : oriented to time [Calm] : calm [de-identified] : Appears well, no acute distress, ambulates easily into the office.  [de-identified] : Remains normocephalic and atraumatic .  [de-identified] : Still supple with full ROM without changes.  [FreeTextEntry1] : No cervical, supraclavicular, axillary or inguinal adenopathy by exam.  [de-identified] : No visible lesion or palpable mass. [de-identified] : Soft, non tense and non tender- no changes.\par Epigastrium WNL- no changes.\par Periumbilical fascia intact- no changes.\par Right groin with faint/ well healed inguinal hernia repair incision- no changes.\par No visible/ palpable recurrent right inguinal, femoral or Spigelian hernia.\par Left groin with well healing operative incision with wound clean, dry and intact- no changes.\par There is no expressible drainage, appreciable odor or SQ collection- no changes.\par Post-operative edema and ecchymoses now resolved.\par No fascial laxity or defect throughout inguinal/ femoral or more lateral Spigelian hernia spaces to suggest recurrence or new hernia. [de-identified] : Penis free of lesions.  Cords free of edema or hematoma.  Left testicle slightly smaller and more high riding than right- but no changes. [de-identified] : Deferred.  [de-identified] : Grossly symmetric and within normal limits without motor or sensory deficit.

## 2022-01-05 NOTE — REVIEW OF SYSTEMS
[Feeling Poorly] : not feeling poorly [Feeling Tired] : not feeling tired [Joint Swelling] : joint swelling [Joint Stiffness] : joint stiffness [Anxiety] : no anxiety [Depression] : no depression [Negative] : Heme/Lymph [FreeTextEntry9] : "arthritis..."

## 2022-01-05 NOTE — PHYSICAL EXAM
[Respiratory Effort] : normal respiratory effort [Normal Rate and Rhythm] : normal rate and rhythm [No HSM] : no hepatosplenomegaly [Tender] : was nontender [Enlarged] : not enlarged [No Rash or Lesion] : No rash or lesion [Alert] : alert [Oriented to Person] : oriented to person [Oriented to Place] : oriented to place [Oriented to Time] : oriented to time [Calm] : calm [de-identified] : Appears well, no acute distress, ambulates easily into the office.  [de-identified] : Remains normocephalic and atraumatic .  [de-identified] : Still supple with full ROM without changes.  [FreeTextEntry1] : No cervical, supraclavicular, axillary or inguinal adenopathy by exam.  [de-identified] : No visible lesion or palpable mass. [de-identified] : Soft, non tense and non tender- no changes.\par Epigastrium WNL- no changes.\par Periumbilical fascia intact- no changes.\par Right groin with faint/ well healed inguinal hernia repair incision- no changes.\par No visible/ palpable recurrent right inguinal, femoral or Spigelian hernia.\par Left groin with well healing operative incision with wound clean, dry and intact- no changes.\par There is no expressible drainage, appreciable odor or SQ collection- no changes.\par Post-operative edema and ecchymoses now resolved.\par No fascial laxity or defect throughout inguinal/ femoral or more lateral Spigelian hernia spaces to suggest recurrence or new hernia. [de-identified] : Penis free of lesions.  Cords free of edema or hematoma.  Left testicle slightly smaller and more high riding than right- but no changes. [de-identified] : Deferred.  [de-identified] : Grossly symmetric and within normal limits without motor or sensory deficit.

## 2024-04-18 NOTE — H&P PST ADULT - NOTES
Anesthesia Evaluation     Patient summary reviewed and Nursing notes reviewed                Airway   Mallampati: II  TM distance: >3 FB  Neck ROM: full  Dental    (+) upper dentures    Pulmonary    (+) ,shortness of breath  Cardiovascular     ECG reviewed  Rhythm: regular  Rate: normal    (+) hyperlipidemia      Neuro/Psych  (+) seizures, psychiatric history Anxiety and Depression  GI/Hepatic/Renal/Endo    (+) GERD, liver disease, diabetes mellitus type 2, thyroid problem hypothyroidism    Musculoskeletal (-) negative ROS    Abdominal    Substance History - negative use     OB/GYN negative ob/gyn ROS         Other      history of cancer                  Anesthesia Plan    ASA 3     MAC     (MS with left foot drop  Full upper dentures  DM2)  intravenous induction     Anesthetic plan, risks, benefits, and alternatives have been provided, discussed and informed consent has been obtained with: patient.    CODE STATUS:          5 children

## 2025-07-30 ENCOUNTER — INPATIENT (INPATIENT)
Facility: HOSPITAL | Age: 85
LOS: 2 days | Discharge: HOME HEALTH | DRG: 282 | End: 2025-08-02
Attending: STUDENT IN AN ORGANIZED HEALTH CARE EDUCATION/TRAINING PROGRAM | Admitting: HOSPITALIST
Payer: MEDICARE

## 2025-07-30 VITALS — WEIGHT: 136.69 LBS

## 2025-07-30 DIAGNOSIS — C67.9 MALIGNANT NEOPLASM OF BLADDER, UNSPECIFIED: ICD-10-CM

## 2025-07-30 DIAGNOSIS — I21.4 NON-ST ELEVATION (NSTEMI) MYOCARDIAL INFARCTION: ICD-10-CM

## 2025-07-30 DIAGNOSIS — Z98.49 CATARACT EXTRACTION STATUS, UNSPECIFIED EYE: Chronic | ICD-10-CM

## 2025-07-30 DIAGNOSIS — I50.23 ACUTE ON CHRONIC SYSTOLIC (CONGESTIVE) HEART FAILURE: ICD-10-CM

## 2025-07-30 DIAGNOSIS — I50.22 CHRONIC SYSTOLIC (CONGESTIVE) HEART FAILURE: ICD-10-CM

## 2025-07-30 DIAGNOSIS — Z29.9 ENCOUNTER FOR PROPHYLACTIC MEASURES, UNSPECIFIED: ICD-10-CM

## 2025-07-30 LAB
ALBUMIN SERPL ELPH-MCNC: 3.4 G/DL — SIGNIFICANT CHANGE UP (ref 3.3–5)
ALP SERPL-CCNC: 135 U/L — HIGH (ref 40–120)
ALT FLD-CCNC: 21 U/L — SIGNIFICANT CHANGE UP (ref 10–45)
ANION GAP SERPL CALC-SCNC: 12 MMOL/L — SIGNIFICANT CHANGE UP (ref 5–17)
APTT BLD: 91.2 SEC — HIGH (ref 26.1–36.8)
AST SERPL-CCNC: 73 U/L — HIGH (ref 10–40)
BILIRUB SERPL-MCNC: 0.2 MG/DL — SIGNIFICANT CHANGE UP (ref 0.2–1.2)
BUN SERPL-MCNC: 29 MG/DL — HIGH (ref 7–23)
CALCIUM SERPL-MCNC: 9.1 MG/DL — SIGNIFICANT CHANGE UP (ref 8.4–10.5)
CHLORIDE SERPL-SCNC: 106 MMOL/L — SIGNIFICANT CHANGE UP (ref 96–108)
CO2 SERPL-SCNC: 23 MMOL/L — SIGNIFICANT CHANGE UP (ref 22–31)
CREAT SERPL-MCNC: 1.73 MG/DL — HIGH (ref 0.5–1.3)
EGFR: 39 ML/MIN/1.73M2 — LOW
EGFR: 39 ML/MIN/1.73M2 — LOW
GLUCOSE BLDC GLUCOMTR-MCNC: 80 MG/DL — SIGNIFICANT CHANGE UP (ref 70–99)
GLUCOSE SERPL-MCNC: 57 MG/DL — LOW (ref 70–99)
HCT VFR BLD CALC: 31.2 % — LOW (ref 39–50)
HGB BLD-MCNC: 10 G/DL — LOW (ref 13–17)
INR BLD: 1.54 RATIO — HIGH (ref 0.85–1.16)
MCHC RBC-ENTMCNC: 27.1 PG — SIGNIFICANT CHANGE UP (ref 27–34)
MCHC RBC-ENTMCNC: 32.1 G/DL — SIGNIFICANT CHANGE UP (ref 32–36)
MCV RBC AUTO: 84.6 FL — SIGNIFICANT CHANGE UP (ref 80–100)
NRBC # BLD AUTO: 0 K/UL — SIGNIFICANT CHANGE UP (ref 0–0)
NRBC # FLD: 0 K/UL — SIGNIFICANT CHANGE UP (ref 0–0)
NRBC BLD AUTO-RTO: 0 /100 WBCS — SIGNIFICANT CHANGE UP (ref 0–0)
PLATELET # BLD AUTO: 539 K/UL — HIGH (ref 150–400)
PMV BLD: 8.9 FL — SIGNIFICANT CHANGE UP (ref 7–13)
POTASSIUM SERPL-MCNC: 4.5 MMOL/L — SIGNIFICANT CHANGE UP (ref 3.5–5.3)
POTASSIUM SERPL-SCNC: 4.5 MMOL/L — SIGNIFICANT CHANGE UP (ref 3.5–5.3)
PROT SERPL-MCNC: 7.4 G/DL — SIGNIFICANT CHANGE UP (ref 6–8.3)
PROTHROM AB SERPL-ACNC: 17.5 SEC — HIGH (ref 9.9–13.4)
RBC # BLD: 3.69 M/UL — LOW (ref 4.2–5.8)
RBC # FLD: 18.7 % — HIGH (ref 10.3–14.5)
SODIUM SERPL-SCNC: 141 MMOL/L — SIGNIFICANT CHANGE UP (ref 135–145)
TROPONIN T, HIGH SENSITIVITY RESULT: 2307 NG/L — HIGH (ref 0–51)
WBC # BLD: 21.82 K/UL — HIGH (ref 3.8–10.5)
WBC # FLD AUTO: 21.82 K/UL — HIGH (ref 3.8–10.5)

## 2025-07-30 PROCEDURE — 85730 THROMBOPLASTIN TIME PARTIAL: CPT

## 2025-07-30 PROCEDURE — 85027 COMPLETE CBC AUTOMATED: CPT

## 2025-07-30 PROCEDURE — 80053 COMPREHEN METABOLIC PANEL: CPT

## 2025-07-30 PROCEDURE — 99223 1ST HOSP IP/OBS HIGH 75: CPT | Mod: GC,AI

## 2025-07-30 PROCEDURE — 84484 ASSAY OF TROPONIN QUANT: CPT

## 2025-07-30 PROCEDURE — 82962 GLUCOSE BLOOD TEST: CPT

## 2025-07-30 PROCEDURE — 93010 ELECTROCARDIOGRAM REPORT: CPT

## 2025-07-30 PROCEDURE — 85610 PROTHROMBIN TIME: CPT

## 2025-07-30 PROCEDURE — 99223 1ST HOSP IP/OBS HIGH 75: CPT

## 2025-07-30 PROCEDURE — 99497 ADVNCD CARE PLAN 30 MIN: CPT | Mod: 25

## 2025-07-30 RX ORDER — INSULIN LISPRO 100 U/ML
INJECTION, SOLUTION INTRAVENOUS; SUBCUTANEOUS
Refills: 0 | Status: DISCONTINUED | OUTPATIENT
Start: 2025-07-30 | End: 2025-08-02

## 2025-07-30 RX ORDER — ONDANSETRON HCL/PF 4 MG/2 ML
4 VIAL (ML) INJECTION EVERY 8 HOURS
Refills: 0 | Status: DISCONTINUED | OUTPATIENT
Start: 2025-07-30 | End: 2025-08-02

## 2025-07-30 RX ORDER — ASPIRIN 325 MG
81 TABLET ORAL DAILY
Refills: 0 | Status: DISCONTINUED | OUTPATIENT
Start: 2025-07-30 | End: 2025-08-02

## 2025-07-30 RX ORDER — TAMSULOSIN HYDROCHLORIDE 0.4 MG/1
0.4 CAPSULE ORAL AT BEDTIME
Refills: 0 | Status: DISCONTINUED | OUTPATIENT
Start: 2025-07-30 | End: 2025-08-02

## 2025-07-30 RX ORDER — HEPARIN SODIUM 1000 [USP'U]/ML
830 INJECTION INTRAVENOUS; SUBCUTANEOUS
Qty: 25000 | Refills: 0 | Status: DISCONTINUED | OUTPATIENT
Start: 2025-07-30 | End: 2025-08-01

## 2025-07-30 RX ORDER — LEVOTHYROXINE SODIUM 300 MCG
50 TABLET ORAL DAILY
Refills: 0 | Status: DISCONTINUED | OUTPATIENT
Start: 2025-07-30 | End: 2025-08-02

## 2025-07-30 RX ORDER — PAROXETINE HYDROCHLORIDE 20 MG/1
20 TABLET, FILM COATED ORAL DAILY
Refills: 0 | Status: DISCONTINUED | OUTPATIENT
Start: 2025-07-30 | End: 2025-08-02

## 2025-07-30 RX ORDER — DEXTROSE 50 % IN WATER 50 %
25 SYRINGE (ML) INTRAVENOUS ONCE
Refills: 0 | Status: DISCONTINUED | OUTPATIENT
Start: 2025-07-30 | End: 2025-08-02

## 2025-07-30 RX ORDER — SODIUM CHLORIDE 9 G/1000ML
1000 INJECTION, SOLUTION INTRAVENOUS
Refills: 0 | Status: DISCONTINUED | OUTPATIENT
Start: 2025-07-30 | End: 2025-08-02

## 2025-07-30 RX ORDER — PROCHLORPERAZINE 25 MG
10 SUPPOSITORY, RECTAL RECTAL DAILY
Refills: 0 | Status: DISCONTINUED | OUTPATIENT
Start: 2025-07-30 | End: 2025-08-02

## 2025-07-30 RX ORDER — ASPIRIN 325 MG
0 TABLET ORAL
Refills: 0 | DISCHARGE

## 2025-07-30 RX ORDER — GLUCAGON 3 MG/1
1 POWDER NASAL ONCE
Refills: 0 | Status: DISCONTINUED | OUTPATIENT
Start: 2025-07-30 | End: 2025-08-02

## 2025-07-30 RX ORDER — MELATONIN 5 MG
3 TABLET ORAL AT BEDTIME
Refills: 0 | Status: DISCONTINUED | OUTPATIENT
Start: 2025-07-30 | End: 2025-08-02

## 2025-07-30 RX ORDER — GABAPENTIN 400 MG/1
300 CAPSULE ORAL
Refills: 0 | Status: DISCONTINUED | OUTPATIENT
Start: 2025-07-30 | End: 2025-08-02

## 2025-07-30 RX ORDER — MAGNESIUM, ALUMINUM HYDROXIDE 200-200 MG
30 TABLET,CHEWABLE ORAL EVERY 4 HOURS
Refills: 0 | Status: DISCONTINUED | OUTPATIENT
Start: 2025-07-30 | End: 2025-08-02

## 2025-07-30 RX ORDER — DEXTROSE 50 % IN WATER 50 %
15 SYRINGE (ML) INTRAVENOUS ONCE
Refills: 0 | Status: DISCONTINUED | OUTPATIENT
Start: 2025-07-30 | End: 2025-08-02

## 2025-07-30 RX ORDER — ACETAMINOPHEN 500 MG/5ML
650 LIQUID (ML) ORAL EVERY 6 HOURS
Refills: 0 | Status: DISCONTINUED | OUTPATIENT
Start: 2025-07-30 | End: 2025-08-02

## 2025-07-30 RX ORDER — DEXTROSE 50 % IN WATER 50 %
12.5 SYRINGE (ML) INTRAVENOUS ONCE
Refills: 0 | Status: DISCONTINUED | OUTPATIENT
Start: 2025-07-30 | End: 2025-08-02

## 2025-07-30 RX ADMIN — HEPARIN SODIUM 830 UNIT(S)/HR: 1000 INJECTION INTRAVENOUS; SUBCUTANEOUS at 18:39

## 2025-07-30 RX ADMIN — GABAPENTIN 300 MILLIGRAM(S): 400 CAPSULE ORAL at 17:16

## 2025-07-30 RX ADMIN — TAMSULOSIN HYDROCHLORIDE 0.4 MILLIGRAM(S): 0.4 CAPSULE ORAL at 23:00

## 2025-07-31 ENCOUNTER — RESULT REVIEW (OUTPATIENT)
Age: 85
End: 2025-07-31

## 2025-07-31 LAB
ADD ON TEST-SPECIMEN IN LAB: SIGNIFICANT CHANGE UP
ANION GAP SERPL CALC-SCNC: 11 MMOL/L — SIGNIFICANT CHANGE UP (ref 5–17)
APTT BLD: 57.6 SEC — HIGH (ref 26.1–36.8)
APTT BLD: 72.6 SEC — HIGH (ref 26.1–36.8)
APTT BLD: 76.8 SEC — HIGH (ref 26.1–36.8)
BASOPHILS # BLD AUTO: 0.06 K/UL — SIGNIFICANT CHANGE UP (ref 0–0.2)
BASOPHILS NFR BLD AUTO: 0.6 % — SIGNIFICANT CHANGE UP (ref 0–2)
BUN SERPL-MCNC: 28 MG/DL — HIGH (ref 7–23)
CALCIUM SERPL-MCNC: 9 MG/DL — SIGNIFICANT CHANGE UP (ref 8.4–10.5)
CHLORIDE SERPL-SCNC: 106 MMOL/L — SIGNIFICANT CHANGE UP (ref 96–108)
CK MB CFR SERPL CALC: 36.7 NG/ML — HIGH (ref 0–6.7)
CO2 SERPL-SCNC: 24 MMOL/L — SIGNIFICANT CHANGE UP (ref 22–31)
CREAT SERPL-MCNC: 1.9 MG/DL — HIGH (ref 0.5–1.3)
EGFR: 35 ML/MIN/1.73M2 — LOW
EGFR: 35 ML/MIN/1.73M2 — LOW
EOSINOPHIL # BLD AUTO: 0.09 K/UL — SIGNIFICANT CHANGE UP (ref 0–0.5)
EOSINOPHIL NFR BLD AUTO: 0.9 % — SIGNIFICANT CHANGE UP (ref 0–6)
GLUCOSE BLDC GLUCOMTR-MCNC: 105 MG/DL — HIGH (ref 70–99)
GLUCOSE BLDC GLUCOMTR-MCNC: 120 MG/DL — HIGH (ref 70–99)
GLUCOSE BLDC GLUCOMTR-MCNC: 140 MG/DL — HIGH (ref 70–99)
GLUCOSE BLDC GLUCOMTR-MCNC: 166 MG/DL — HIGH (ref 70–99)
GLUCOSE SERPL-MCNC: 71 MG/DL — SIGNIFICANT CHANGE UP (ref 70–99)
HCT VFR BLD CALC: 29.9 % — LOW (ref 39–50)
HCT VFR BLD CALC: 30.1 % — LOW (ref 39–50)
HGB BLD-MCNC: 9.4 G/DL — LOW (ref 13–17)
HGB BLD-MCNC: 9.5 G/DL — LOW (ref 13–17)
IMM GRANULOCYTES # BLD AUTO: 0.07 K/UL — SIGNIFICANT CHANGE UP (ref 0–0.07)
IMM GRANULOCYTES NFR BLD AUTO: 0.7 % — SIGNIFICANT CHANGE UP (ref 0–0.9)
LYMPHOCYTES # BLD AUTO: 1.6 K/UL — SIGNIFICANT CHANGE UP (ref 1–3.3)
LYMPHOCYTES NFR BLD AUTO: 16.1 % — SIGNIFICANT CHANGE UP (ref 13–44)
MAGNESIUM SERPL-MCNC: 2.1 MG/DL — SIGNIFICANT CHANGE UP (ref 1.6–2.6)
MCHC RBC-ENTMCNC: 26.3 PG — LOW (ref 27–34)
MCHC RBC-ENTMCNC: 26.4 PG — LOW (ref 27–34)
MCHC RBC-ENTMCNC: 31.4 G/DL — LOW (ref 32–36)
MCHC RBC-ENTMCNC: 31.6 G/DL — LOW (ref 32–36)
MCV RBC AUTO: 83.6 FL — SIGNIFICANT CHANGE UP (ref 80–100)
MCV RBC AUTO: 83.8 FL — SIGNIFICANT CHANGE UP (ref 80–100)
MONOCYTES # BLD AUTO: 0.66 K/UL — SIGNIFICANT CHANGE UP (ref 0–0.9)
MONOCYTES NFR BLD AUTO: 6.7 % — SIGNIFICANT CHANGE UP (ref 2–14)
NEUTROPHILS # BLD AUTO: 7.43 K/UL — HIGH (ref 1.8–7.4)
NEUTROPHILS NFR BLD AUTO: 75 % — SIGNIFICANT CHANGE UP (ref 43–77)
NRBC # BLD AUTO: 0 K/UL — SIGNIFICANT CHANGE UP (ref 0–0)
NRBC # BLD AUTO: 0 K/UL — SIGNIFICANT CHANGE UP (ref 0–0)
NRBC # FLD: 0 K/UL — SIGNIFICANT CHANGE UP (ref 0–0)
NRBC # FLD: 0 K/UL — SIGNIFICANT CHANGE UP (ref 0–0)
NRBC BLD AUTO-RTO: 0 /100 WBCS — SIGNIFICANT CHANGE UP (ref 0–0)
NRBC BLD AUTO-RTO: 0 /100 WBCS — SIGNIFICANT CHANGE UP (ref 0–0)
PHOSPHATE SERPL-MCNC: 2.8 MG/DL — SIGNIFICANT CHANGE UP (ref 2.5–4.5)
PLATELET # BLD AUTO: 495 K/UL — HIGH (ref 150–400)
PLATELET # BLD AUTO: 500 K/UL — HIGH (ref 150–400)
PMV BLD: 8.9 FL — SIGNIFICANT CHANGE UP (ref 7–13)
PMV BLD: 9.1 FL — SIGNIFICANT CHANGE UP (ref 7–13)
POTASSIUM SERPL-MCNC: 4.3 MMOL/L — SIGNIFICANT CHANGE UP (ref 3.5–5.3)
POTASSIUM SERPL-SCNC: 4.3 MMOL/L — SIGNIFICANT CHANGE UP (ref 3.5–5.3)
RBC # BLD: 3.57 M/UL — LOW (ref 4.2–5.8)
RBC # BLD: 3.6 M/UL — LOW (ref 4.2–5.8)
RBC # FLD: 19 % — HIGH (ref 10.3–14.5)
RBC # FLD: 19.1 % — HIGH (ref 10.3–14.5)
SODIUM SERPL-SCNC: 141 MMOL/L — SIGNIFICANT CHANGE UP (ref 135–145)
TROPONIN T, HIGH SENSITIVITY RESULT: 2182 NG/L — HIGH (ref 0–51)
WBC # BLD: 12.46 K/UL — HIGH (ref 3.8–10.5)
WBC # BLD: 9.91 K/UL — SIGNIFICANT CHANGE UP (ref 3.8–10.5)
WBC # FLD AUTO: 12.46 K/UL — HIGH (ref 3.8–10.5)
WBC # FLD AUTO: 9.91 K/UL — SIGNIFICANT CHANGE UP (ref 3.8–10.5)

## 2025-07-31 PROCEDURE — 84484 ASSAY OF TROPONIN QUANT: CPT

## 2025-07-31 PROCEDURE — 93356 MYOCRD STRAIN IMG SPCKL TRCK: CPT

## 2025-07-31 PROCEDURE — 85610 PROTHROMBIN TIME: CPT

## 2025-07-31 PROCEDURE — 93306 TTE W/DOPPLER COMPLETE: CPT | Mod: 26

## 2025-07-31 PROCEDURE — 99232 SBSQ HOSP IP/OBS MODERATE 35: CPT | Mod: GC

## 2025-07-31 PROCEDURE — 84100 ASSAY OF PHOSPHORUS: CPT

## 2025-07-31 PROCEDURE — 80053 COMPREHEN METABOLIC PANEL: CPT

## 2025-07-31 PROCEDURE — 82553 CREATINE MB FRACTION: CPT

## 2025-07-31 PROCEDURE — 82962 GLUCOSE BLOOD TEST: CPT

## 2025-07-31 PROCEDURE — 85730 THROMBOPLASTIN TIME PARTIAL: CPT

## 2025-07-31 PROCEDURE — 85027 COMPLETE CBC AUTOMATED: CPT

## 2025-07-31 PROCEDURE — 85025 COMPLETE CBC W/AUTO DIFF WBC: CPT

## 2025-07-31 PROCEDURE — 80048 BASIC METABOLIC PNL TOTAL CA: CPT

## 2025-07-31 PROCEDURE — 83735 ASSAY OF MAGNESIUM: CPT

## 2025-07-31 PROCEDURE — 99233 SBSQ HOSP IP/OBS HIGH 50: CPT

## 2025-07-31 RX ORDER — PREDNISONE 20 MG/1
50 TABLET ORAL ONCE
Refills: 0 | Status: COMPLETED | OUTPATIENT
Start: 2025-08-01 | End: 2025-08-01

## 2025-07-31 RX ORDER — MIRABEGRON 50 MG/1
1 TABLET, FILM COATED, EXTENDED RELEASE ORAL
Refills: 0 | DISCHARGE

## 2025-07-31 RX ORDER — SENNOSIDES, DOCUSATE SODIUM 8.6; 5 MG/1; MG/1
2 TABLET ORAL
Refills: 0 | DISCHARGE

## 2025-07-31 RX ORDER — ROSUVASTATIN CALCIUM 20 MG/1
10 TABLET, FILM COATED ORAL AT BEDTIME
Refills: 0 | Status: DISCONTINUED | OUTPATIENT
Start: 2025-07-31 | End: 2025-08-02

## 2025-07-31 RX ORDER — DIPHENHYDRAMINE HCL 12.5MG/5ML
25 ELIXIR ORAL ONCE
Refills: 0 | Status: COMPLETED | OUTPATIENT
Start: 2025-08-01 | End: 2025-08-01

## 2025-07-31 RX ORDER — TRAMADOL HYDROCHLORIDE 50 MG/1
1 TABLET, FILM COATED ORAL
Refills: 0 | DISCHARGE

## 2025-07-31 RX ORDER — PREDNISONE 20 MG/1
50 TABLET ORAL ONCE
Refills: 0 | Status: DISCONTINUED | OUTPATIENT
Start: 2025-07-31 | End: 2025-07-31

## 2025-07-31 RX ORDER — PREDNISONE 20 MG/1
50 TABLET ORAL THREE TIMES A DAY
Refills: 0 | Status: DISCONTINUED | OUTPATIENT
Start: 2025-07-31 | End: 2025-07-31

## 2025-07-31 RX ORDER — GABAPENTIN 400 MG/1
1 CAPSULE ORAL
Refills: 0 | DISCHARGE

## 2025-07-31 RX ORDER — DOCUSATE SODIUM 100 MG
1 CAPSULE ORAL
Refills: 0 | DISCHARGE

## 2025-07-31 RX ORDER — LISINOPRIL 30 MG/1
0.5 TABLET ORAL
Refills: 0 | DISCHARGE

## 2025-07-31 RX ORDER — LEVOTHYROXINE SODIUM 300 MCG
1 TABLET ORAL
Refills: 0 | DISCHARGE

## 2025-07-31 RX ORDER — APIXABAN 2.5 MG/1
1 TABLET, FILM COATED ORAL
Refills: 0 | DISCHARGE

## 2025-07-31 RX ORDER — TAMSULOSIN HYDROCHLORIDE 0.4 MG/1
1 CAPSULE ORAL
Refills: 0 | DISCHARGE

## 2025-07-31 RX ORDER — PAROXETINE HYDROCHLORIDE 20 MG/1
1 TABLET, FILM COATED ORAL
Refills: 0 | DISCHARGE

## 2025-07-31 RX ADMIN — Medication 50 MICROGRAM(S): at 04:58

## 2025-07-31 RX ADMIN — ROSUVASTATIN CALCIUM 10 MILLIGRAM(S): 20 TABLET, FILM COATED ORAL at 21:49

## 2025-07-31 RX ADMIN — GABAPENTIN 300 MILLIGRAM(S): 400 CAPSULE ORAL at 17:38

## 2025-07-31 RX ADMIN — HEPARIN SODIUM 930 UNIT(S)/HR: 1000 INJECTION INTRAVENOUS; SUBCUTANEOUS at 01:09

## 2025-07-31 RX ADMIN — Medication 10 MILLIGRAM(S): at 13:27

## 2025-07-31 RX ADMIN — HEPARIN SODIUM 930 UNIT(S)/HR: 1000 INJECTION INTRAVENOUS; SUBCUTANEOUS at 08:47

## 2025-07-31 RX ADMIN — HEPARIN SODIUM 930 UNIT(S)/HR: 1000 INJECTION INTRAVENOUS; SUBCUTANEOUS at 16:23

## 2025-07-31 RX ADMIN — HEPARIN SODIUM 930 UNIT(S)/HR: 1000 INJECTION INTRAVENOUS; SUBCUTANEOUS at 21:44

## 2025-07-31 RX ADMIN — TAMSULOSIN HYDROCHLORIDE 0.4 MILLIGRAM(S): 0.4 CAPSULE ORAL at 21:49

## 2025-07-31 RX ADMIN — Medication 81 MILLIGRAM(S): at 13:27

## 2025-07-31 RX ADMIN — GABAPENTIN 300 MILLIGRAM(S): 400 CAPSULE ORAL at 04:59

## 2025-07-31 RX ADMIN — PAROXETINE HYDROCHLORIDE 20 MILLIGRAM(S): 20 TABLET, FILM COATED ORAL at 13:27

## 2025-07-31 RX ADMIN — Medication 40 MILLIGRAM(S): at 04:59

## 2025-07-31 RX ADMIN — Medication 20 MILLIGRAM(S): at 13:27

## 2025-08-01 ENCOUNTER — TRANSCRIPTION ENCOUNTER (OUTPATIENT)
Age: 85
End: 2025-08-01

## 2025-08-01 LAB
ANION GAP SERPL CALC-SCNC: 15 MMOL/L — SIGNIFICANT CHANGE UP (ref 5–17)
APTT BLD: 72.3 SEC — HIGH (ref 26.1–36.8)
BASOPHILS # BLD AUTO: 0.02 K/UL — SIGNIFICANT CHANGE UP (ref 0–0.2)
BASOPHILS NFR BLD AUTO: 0.2 % — SIGNIFICANT CHANGE UP (ref 0–2)
BUN SERPL-MCNC: 29 MG/DL — HIGH (ref 7–23)
CALCIUM SERPL-MCNC: 9.2 MG/DL — SIGNIFICANT CHANGE UP (ref 8.4–10.5)
CHLORIDE SERPL-SCNC: 101 MMOL/L — SIGNIFICANT CHANGE UP (ref 96–108)
CO2 SERPL-SCNC: 22 MMOL/L — SIGNIFICANT CHANGE UP (ref 22–31)
CREAT SERPL-MCNC: 1.78 MG/DL — HIGH (ref 0.5–1.3)
EGFR: 37 ML/MIN/1.73M2 — LOW
EGFR: 37 ML/MIN/1.73M2 — LOW
EOSINOPHIL # BLD AUTO: 0.03 K/UL — SIGNIFICANT CHANGE UP (ref 0–0.5)
EOSINOPHIL NFR BLD AUTO: 0.3 % — SIGNIFICANT CHANGE UP (ref 0–6)
GLUCOSE BLDC GLUCOMTR-MCNC: 217 MG/DL — HIGH (ref 70–99)
GLUCOSE BLDC GLUCOMTR-MCNC: 263 MG/DL — HIGH (ref 70–99)
GLUCOSE BLDC GLUCOMTR-MCNC: 276 MG/DL — HIGH (ref 70–99)
GLUCOSE BLDC GLUCOMTR-MCNC: 284 MG/DL — HIGH (ref 70–99)
GLUCOSE SERPL-MCNC: 188 MG/DL — HIGH (ref 70–99)
HCT VFR BLD CALC: 31.8 % — LOW (ref 39–50)
HGB BLD-MCNC: 10 G/DL — LOW (ref 13–17)
IMM GRANULOCYTES # BLD AUTO: 0.1 K/UL — HIGH (ref 0–0.07)
IMM GRANULOCYTES NFR BLD AUTO: 0.9 % — SIGNIFICANT CHANGE UP (ref 0–0.9)
LYMPHOCYTES # BLD AUTO: 0.84 K/UL — LOW (ref 1–3.3)
LYMPHOCYTES NFR BLD AUTO: 7.2 % — LOW (ref 13–44)
MAGNESIUM SERPL-MCNC: 2 MG/DL — SIGNIFICANT CHANGE UP (ref 1.6–2.6)
MCHC RBC-ENTMCNC: 26.6 PG — LOW (ref 27–34)
MCHC RBC-ENTMCNC: 31.4 G/DL — LOW (ref 32–36)
MCV RBC AUTO: 84.6 FL — SIGNIFICANT CHANGE UP (ref 80–100)
MONOCYTES # BLD AUTO: 0.07 K/UL — SIGNIFICANT CHANGE UP (ref 0–0.9)
MONOCYTES NFR BLD AUTO: 0.6 % — LOW (ref 2–14)
NEUTROPHILS # BLD AUTO: 10.64 K/UL — HIGH (ref 1.8–7.4)
NEUTROPHILS NFR BLD AUTO: 90.8 % — HIGH (ref 43–77)
NRBC # BLD AUTO: 0 K/UL — SIGNIFICANT CHANGE UP (ref 0–0)
NRBC # FLD: 0 K/UL — SIGNIFICANT CHANGE UP (ref 0–0)
NRBC BLD AUTO-RTO: 0 /100 WBCS — SIGNIFICANT CHANGE UP (ref 0–0)
PHOSPHATE SERPL-MCNC: 2.5 MG/DL — SIGNIFICANT CHANGE UP (ref 2.5–4.5)
PLATELET # BLD AUTO: 533 K/UL — HIGH (ref 150–400)
PMV BLD: 9.3 FL — SIGNIFICANT CHANGE UP (ref 7–13)
POTASSIUM SERPL-MCNC: 4.7 MMOL/L — SIGNIFICANT CHANGE UP (ref 3.5–5.3)
POTASSIUM SERPL-SCNC: 4.7 MMOL/L — SIGNIFICANT CHANGE UP (ref 3.5–5.3)
RBC # BLD: 3.76 M/UL — LOW (ref 4.2–5.8)
RBC # FLD: 19.3 % — HIGH (ref 10.3–14.5)
SODIUM SERPL-SCNC: 138 MMOL/L — SIGNIFICANT CHANGE UP (ref 135–145)
WBC # BLD: 11.7 K/UL — HIGH (ref 3.8–10.5)
WBC # FLD AUTO: 11.7 K/UL — HIGH (ref 3.8–10.5)

## 2025-08-01 PROCEDURE — 99232 SBSQ HOSP IP/OBS MODERATE 35: CPT | Mod: GC

## 2025-08-01 PROCEDURE — 99233 SBSQ HOSP IP/OBS HIGH 50: CPT

## 2025-08-01 RX ORDER — MIRABEGRON 50 MG/1
1 TABLET, FILM COATED, EXTENDED RELEASE ORAL
Refills: 0 | DISCHARGE

## 2025-08-01 RX ORDER — DIPHENHYDRAMINE HCL 12.5MG/5ML
50 ELIXIR ORAL ONCE
Refills: 0 | Status: COMPLETED | OUTPATIENT
Start: 2025-08-01 | End: 2025-08-01

## 2025-08-01 RX ORDER — EZETIMIBE AND SIMVASTATIN 10; 20 MG/1; MG/1
1 TABLET, FILM COATED ORAL
Refills: 0 | DISCHARGE

## 2025-08-01 RX ORDER — ROSUVASTATIN CALCIUM 20 MG/1
1 TABLET, FILM COATED ORAL
Qty: 30 | Refills: 0
Start: 2025-08-01 | End: 2025-08-30

## 2025-08-01 RX ORDER — PROCHLORPERAZINE 25 MG
1 SUPPOSITORY, RECTAL RECTAL
Refills: 0 | DISCHARGE

## 2025-08-01 RX ORDER — HEPARIN SODIUM 1000 [USP'U]/ML
830 INJECTION INTRAVENOUS; SUBCUTANEOUS
Qty: 25000 | Refills: 0 | Status: DISCONTINUED | OUTPATIENT
Start: 2025-08-01 | End: 2025-08-02

## 2025-08-01 RX ORDER — PROCHLORPERAZINE 25 MG
1 SUPPOSITORY, RECTAL RECTAL
Qty: 0 | Refills: 0 | DISCHARGE
Start: 2025-08-01

## 2025-08-01 RX ORDER — ROSUVASTATIN CALCIUM 20 MG/1
1 TABLET, FILM COATED ORAL
Qty: 30 | Refills: 0
Start: 2025-08-01

## 2025-08-01 RX ADMIN — GABAPENTIN 300 MILLIGRAM(S): 400 CAPSULE ORAL at 05:21

## 2025-08-01 RX ADMIN — PREDNISONE 50 MILLIGRAM(S): 20 TABLET ORAL at 07:30

## 2025-08-01 RX ADMIN — GABAPENTIN 300 MILLIGRAM(S): 400 CAPSULE ORAL at 18:13

## 2025-08-01 RX ADMIN — Medication 50 MICROGRAM(S): at 05:22

## 2025-08-01 RX ADMIN — ROSUVASTATIN CALCIUM 10 MILLIGRAM(S): 20 TABLET, FILM COATED ORAL at 21:40

## 2025-08-01 RX ADMIN — INSULIN LISPRO 3: 100 INJECTION, SOLUTION INTRAVENOUS; SUBCUTANEOUS at 13:30

## 2025-08-01 RX ADMIN — Medication 50 MILLIGRAM(S): at 14:45

## 2025-08-01 RX ADMIN — Medication 250 MILLILITER(S): at 14:45

## 2025-08-01 RX ADMIN — Medication 10 MILLIGRAM(S): at 13:30

## 2025-08-01 RX ADMIN — INSULIN LISPRO 2: 100 INJECTION, SOLUTION INTRAVENOUS; SUBCUTANEOUS at 18:13

## 2025-08-01 RX ADMIN — Medication 20 MILLIGRAM(S): at 13:30

## 2025-08-01 RX ADMIN — PAROXETINE HYDROCHLORIDE 20 MILLIGRAM(S): 20 TABLET, FILM COATED ORAL at 13:30

## 2025-08-01 RX ADMIN — PREDNISONE 50 MILLIGRAM(S): 20 TABLET ORAL at 13:30

## 2025-08-01 RX ADMIN — Medication 75 MILLILITER(S): at 14:45

## 2025-08-01 RX ADMIN — PREDNISONE 50 MILLIGRAM(S): 20 TABLET ORAL at 01:05

## 2025-08-01 RX ADMIN — Medication 81 MILLIGRAM(S): at 13:30

## 2025-08-01 RX ADMIN — Medication 40 MILLIGRAM(S): at 05:22

## 2025-08-01 RX ADMIN — TAMSULOSIN HYDROCHLORIDE 0.4 MILLIGRAM(S): 0.4 CAPSULE ORAL at 21:40

## 2025-08-01 RX ADMIN — HEPARIN SODIUM 930 UNIT(S)/HR: 1000 INJECTION INTRAVENOUS; SUBCUTANEOUS at 08:45

## 2025-08-01 RX ADMIN — INSULIN LISPRO 3: 100 INJECTION, SOLUTION INTRAVENOUS; SUBCUTANEOUS at 09:19

## 2025-08-02 VITALS
SYSTOLIC BLOOD PRESSURE: 107 MMHG | RESPIRATION RATE: 18 BRPM | TEMPERATURE: 98 F | DIASTOLIC BLOOD PRESSURE: 65 MMHG | OXYGEN SATURATION: 97 % | HEART RATE: 89 BPM

## 2025-08-02 LAB
ANION GAP SERPL CALC-SCNC: 12 MMOL/L — SIGNIFICANT CHANGE UP (ref 5–17)
APTT BLD: 37.8 SEC — HIGH (ref 26.1–36.8)
BASOPHILS # BLD AUTO: 0.01 K/UL — SIGNIFICANT CHANGE UP (ref 0–0.2)
BASOPHILS NFR BLD AUTO: 0.1 % — SIGNIFICANT CHANGE UP (ref 0–2)
BUN SERPL-MCNC: 32 MG/DL — HIGH (ref 7–23)
CALCIUM SERPL-MCNC: 9 MG/DL — SIGNIFICANT CHANGE UP (ref 8.4–10.5)
CHLORIDE SERPL-SCNC: 104 MMOL/L — SIGNIFICANT CHANGE UP (ref 96–108)
CO2 SERPL-SCNC: 22 MMOL/L — SIGNIFICANT CHANGE UP (ref 22–31)
CREAT SERPL-MCNC: 1.81 MG/DL — HIGH (ref 0.5–1.3)
EGFR: 37 ML/MIN/1.73M2 — LOW
EGFR: 37 ML/MIN/1.73M2 — LOW
EOSINOPHIL # BLD AUTO: 0 K/UL — SIGNIFICANT CHANGE UP (ref 0–0.5)
EOSINOPHIL NFR BLD AUTO: 0 % — SIGNIFICANT CHANGE UP (ref 0–6)
GLUCOSE BLDC GLUCOMTR-MCNC: 184 MG/DL — HIGH (ref 70–99)
GLUCOSE BLDC GLUCOMTR-MCNC: 216 MG/DL — HIGH (ref 70–99)
GLUCOSE BLDC GLUCOMTR-MCNC: 226 MG/DL — HIGH (ref 70–99)
GLUCOSE SERPL-MCNC: 192 MG/DL — HIGH (ref 70–99)
HCT VFR BLD CALC: 29.7 % — LOW (ref 39–50)
HGB BLD-MCNC: 9.3 G/DL — LOW (ref 13–17)
IMM GRANULOCYTES # BLD AUTO: 0.07 K/UL — SIGNIFICANT CHANGE UP (ref 0–0.07)
IMM GRANULOCYTES NFR BLD AUTO: 0.6 % — SIGNIFICANT CHANGE UP (ref 0–0.9)
LYMPHOCYTES # BLD AUTO: 1.03 K/UL — SIGNIFICANT CHANGE UP (ref 1–3.3)
LYMPHOCYTES NFR BLD AUTO: 8.9 % — LOW (ref 13–44)
MAGNESIUM SERPL-MCNC: 2 MG/DL — SIGNIFICANT CHANGE UP (ref 1.6–2.6)
MCHC RBC-ENTMCNC: 26.3 PG — LOW (ref 27–34)
MCHC RBC-ENTMCNC: 31.3 G/DL — LOW (ref 32–36)
MCV RBC AUTO: 84.1 FL — SIGNIFICANT CHANGE UP (ref 80–100)
MONOCYTES # BLD AUTO: 0.1 K/UL — SIGNIFICANT CHANGE UP (ref 0–0.9)
MONOCYTES NFR BLD AUTO: 0.9 % — LOW (ref 2–14)
NEUTROPHILS # BLD AUTO: 10.35 K/UL — HIGH (ref 1.8–7.4)
NEUTROPHILS NFR BLD AUTO: 89.5 % — HIGH (ref 43–77)
NRBC # BLD AUTO: 0 K/UL — SIGNIFICANT CHANGE UP (ref 0–0)
NRBC # FLD: 0 K/UL — SIGNIFICANT CHANGE UP (ref 0–0)
NRBC BLD AUTO-RTO: 0 /100 WBCS — SIGNIFICANT CHANGE UP (ref 0–0)
PHOSPHATE SERPL-MCNC: 3 MG/DL — SIGNIFICANT CHANGE UP (ref 2.5–4.5)
PLATELET # BLD AUTO: 531 K/UL — HIGH (ref 150–400)
PMV BLD: 9.5 FL — SIGNIFICANT CHANGE UP (ref 7–13)
POTASSIUM SERPL-MCNC: 4.4 MMOL/L — SIGNIFICANT CHANGE UP (ref 3.5–5.3)
POTASSIUM SERPL-SCNC: 4.4 MMOL/L — SIGNIFICANT CHANGE UP (ref 3.5–5.3)
RBC # BLD: 3.53 M/UL — LOW (ref 4.2–5.8)
RBC # FLD: 18.6 % — HIGH (ref 10.3–14.5)
SODIUM SERPL-SCNC: 138 MMOL/L — SIGNIFICANT CHANGE UP (ref 135–145)
WBC # BLD: 11.56 K/UL — HIGH (ref 3.8–10.5)
WBC # FLD AUTO: 11.56 K/UL — HIGH (ref 3.8–10.5)

## 2025-08-02 PROCEDURE — 82553 CREATINE MB FRACTION: CPT

## 2025-08-02 PROCEDURE — C1887: CPT

## 2025-08-02 PROCEDURE — 85610 PROTHROMBIN TIME: CPT

## 2025-08-02 PROCEDURE — 93356 MYOCRD STRAIN IMG SPCKL TRCK: CPT

## 2025-08-02 PROCEDURE — 85025 COMPLETE CBC W/AUTO DIFF WBC: CPT

## 2025-08-02 PROCEDURE — 99233 SBSQ HOSP IP/OBS HIGH 50: CPT

## 2025-08-02 PROCEDURE — C1894: CPT

## 2025-08-02 PROCEDURE — 80048 BASIC METABOLIC PNL TOTAL CA: CPT

## 2025-08-02 PROCEDURE — 84484 ASSAY OF TROPONIN QUANT: CPT

## 2025-08-02 PROCEDURE — 84100 ASSAY OF PHOSPHORUS: CPT

## 2025-08-02 PROCEDURE — 93306 TTE W/DOPPLER COMPLETE: CPT

## 2025-08-02 PROCEDURE — C1769: CPT

## 2025-08-02 PROCEDURE — 82962 GLUCOSE BLOOD TEST: CPT

## 2025-08-02 PROCEDURE — 80053 COMPREHEN METABOLIC PANEL: CPT

## 2025-08-02 PROCEDURE — 83735 ASSAY OF MAGNESIUM: CPT

## 2025-08-02 PROCEDURE — 93459 L HRT ART/GRFT ANGIO: CPT

## 2025-08-02 PROCEDURE — 85730 THROMBOPLASTIN TIME PARTIAL: CPT

## 2025-08-02 PROCEDURE — 85027 COMPLETE CBC AUTOMATED: CPT

## 2025-08-02 PROCEDURE — 99239 HOSP IP/OBS DSCHRG MGMT >30: CPT

## 2025-08-02 RX ORDER — INSULIN LISPRO 100 U/ML
INJECTION, SOLUTION INTRAVENOUS; SUBCUTANEOUS AT BEDTIME
Refills: 0 | Status: DISCONTINUED | OUTPATIENT
Start: 2025-08-02 | End: 2025-08-02

## 2025-08-02 RX ORDER — CLOPIDOGREL BISULFATE 75 MG/1
75 TABLET, FILM COATED ORAL DAILY
Refills: 0 | Status: DISCONTINUED | OUTPATIENT
Start: 2025-08-02 | End: 2025-08-02

## 2025-08-02 RX ORDER — APIXABAN 2.5 MG/1
5 TABLET, FILM COATED ORAL EVERY 12 HOURS
Refills: 0 | Status: DISCONTINUED | OUTPATIENT
Start: 2025-08-02 | End: 2025-08-02

## 2025-08-02 RX ADMIN — PAROXETINE HYDROCHLORIDE 20 MILLIGRAM(S): 20 TABLET, FILM COATED ORAL at 12:07

## 2025-08-02 RX ADMIN — Medication 20 MILLIGRAM(S): at 12:06

## 2025-08-02 RX ADMIN — INSULIN LISPRO 2: 100 INJECTION, SOLUTION INTRAVENOUS; SUBCUTANEOUS at 12:08

## 2025-08-02 RX ADMIN — GABAPENTIN 300 MILLIGRAM(S): 400 CAPSULE ORAL at 06:21

## 2025-08-02 RX ADMIN — Medication 10 MILLIGRAM(S): at 12:07

## 2025-08-02 RX ADMIN — HEPARIN SODIUM 830 UNIT(S)/HR: 1000 INJECTION INTRAVENOUS; SUBCUTANEOUS at 00:44

## 2025-08-02 RX ADMIN — INSULIN LISPRO 1: 100 INJECTION, SOLUTION INTRAVENOUS; SUBCUTANEOUS at 09:35

## 2025-08-02 RX ADMIN — HEPARIN SODIUM 830 UNIT(S)/HR: 1000 INJECTION INTRAVENOUS; SUBCUTANEOUS at 07:33

## 2025-08-02 RX ADMIN — Medication 50 MICROGRAM(S): at 06:19

## 2025-08-02 RX ADMIN — Medication 81 MILLIGRAM(S): at 12:06

## 2025-08-02 RX ADMIN — Medication 40 MILLIGRAM(S): at 06:19

## 2025-08-02 RX ADMIN — HEPARIN SODIUM 1130 UNIT(S)/HR: 1000 INJECTION INTRAVENOUS; SUBCUTANEOUS at 07:59

## 2025-08-02 RX ADMIN — APIXABAN 5 MILLIGRAM(S): 2.5 TABLET, FILM COATED ORAL at 09:51

## 2025-08-04 ENCOUNTER — TRANSCRIPTION ENCOUNTER (OUTPATIENT)
Age: 85
End: 2025-08-04

## 2025-08-05 ENCOUNTER — TRANSCRIPTION ENCOUNTER (OUTPATIENT)
Age: 85
End: 2025-08-05

## 2025-08-11 ENCOUNTER — TRANSCRIPTION ENCOUNTER (OUTPATIENT)
Age: 85
End: 2025-08-11

## 2025-08-28 ENCOUNTER — TRANSCRIPTION ENCOUNTER (OUTPATIENT)
Age: 85
End: 2025-08-28